# Patient Record
Sex: FEMALE | Race: WHITE | Employment: FULL TIME | URBAN - METROPOLITAN AREA
[De-identification: names, ages, dates, MRNs, and addresses within clinical notes are randomized per-mention and may not be internally consistent; named-entity substitution may affect disease eponyms.]

---

## 2022-09-07 ENCOUNTER — OFFICE VISIT (OUTPATIENT)
Dept: OBGYN CLINIC | Age: 27
End: 2022-09-07
Payer: COMMERCIAL

## 2022-09-07 VITALS
HEIGHT: 69 IN | BODY MASS INDEX: 20.94 KG/M2 | WEIGHT: 141.4 LBS | SYSTOLIC BLOOD PRESSURE: 108 MMHG | DIASTOLIC BLOOD PRESSURE: 74 MMHG

## 2022-09-07 DIAGNOSIS — Z01.419 ENCNTR FOR GYN EXAM (GENERAL) (ROUTINE) W/O ABN FINDINGS: Primary | ICD-10-CM

## 2022-09-07 DIAGNOSIS — Z12.4 ENCOUNTER FOR PAPANICOLAOU SMEAR FOR CERVICAL CANCER SCREENING: ICD-10-CM

## 2022-09-07 DIAGNOSIS — Z30.011 ENCOUNTER FOR INITIAL PRESCRIPTION OF CONTRACEPTIVE PILLS: ICD-10-CM

## 2022-09-07 PROCEDURE — 99385 PREV VISIT NEW AGE 18-39: CPT | Performed by: OBSTETRICS & GYNECOLOGY

## 2022-09-07 RX ORDER — NORETHINDRONE ACETATE AND ETHINYL ESTRADIOL 1MG-20(21)
1 KIT ORAL DAILY
Qty: 1 PACKET | Refills: 3 | Status: SHIPPED | OUTPATIENT
Start: 2022-09-07

## 2022-09-07 ASSESSMENT — PATIENT HEALTH QUESTIONNAIRE - PHQ9
SUM OF ALL RESPONSES TO PHQ QUESTIONS 1-9: 0
SUM OF ALL RESPONSES TO PHQ QUESTIONS 1-9: 0
2. FEELING DOWN, DEPRESSED OR HOPELESS: 0
1. LITTLE INTEREST OR PLEASURE IN DOING THINGS: 0
SUM OF ALL RESPONSES TO PHQ9 QUESTIONS 1 & 2: 0
SUM OF ALL RESPONSES TO PHQ QUESTIONS 1-9: 0
SUM OF ALL RESPONSES TO PHQ QUESTIONS 1-9: 0

## 2022-09-07 NOTE — PROGRESS NOTES
[facility]  [address] [facility], [address], [address] [facility]    Bradley Chavez MD, Karen Belle MD, FACOG  SAMANTHA Thao-BC    Assessment/Plan     Loretta Hernandez was seen today for new patient. Diagnoses and all orders for this visit:    Encntr for gyn exam (general) (routine) w/o abn findings  Comments:  - pap done  - self breast awareness encouraged  - virginal, getting  and requested OCPs    Encounter for Papanicolaou smear for cervical cancer screening  -     PAP LB, Reflex HPV ASCUS ([id number]); Future  -     PAP LB, Reflex HPV ASCUS ([id number])    Encounter for initial prescription of contraceptive pills  Comments:  - discussed various types of contraceptions inlcuding IUD, OCPs, Nuvaring. No CI to combined hormones. VTE warning signs and risk discussed. AUB can occur 3-6 mo when starting a new contraceptive. She is getting  in 2.5 months. Orders:  -     norethindrone-ethinyl estradiol (LOESTRIN FE 1/20) 1-20 MG-MCG per tablet; Take 1 tablet by mouth daily    BMI 20.0-20.9, adult         Subjective     Marisela Chafe  27 y.o. G0 presents today for annual Gyn exam.  New patient, has never had pelvic exam before. She is getting  in Nov. Her fiance is an ortho resident at [address] [place] [address]. 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 Menarche age 10, was regular every 28 days until she was in college and began having irregular periods. She went 9 months without a period when she was in nursing school. Evaluated by student health at [facility] when she was having a period q 3 months and was told to just watch it. No US or labs done at that time. She runs and eats healthy but normal BMI 20. Periods have been more consistent the last 2-3 months, lasting 5-6 days. Normal BM. No UTI symptoms. No breast concerns or nipple discharge. No HA, visual changes.          LAST PAP: never    LAST MAMMO: L breast US 2017 due to fibrocystic breast tissue    LMP: Patient's last menstrual period was 08/30/2022. BIRTH CONTROL: abstinence      OB History   No obstetric history on file. History reviewed. No pertinent past medical history. History reviewed. No pertinent surgical history. History reviewed. No pertinent family history.         Social History     Socioeconomic History    Marital status: Single     Spouse name: Not on file    Number of children: Not on file    Years of education: Not on file    Highest education level: Not on file   Occupational History    Not on file   Tobacco Use    Smoking status: Never    Smokeless tobacco: Never   Vaping Use    Vaping Use: Never used   Substance and Sexual Activity    Alcohol use: Yes    Drug use: Never    Sexual activity: Not Currently   Other Topics Concern    Not on file   Social History Narrative    Not on file     Social Determinants of Health     Financial Resource Strain: Not on file   Food Insecurity: Not on file   Transportation Needs: Not on file   Physical Activity: Not on file   Stress: Not on file   Social Connections: Not on file   Intimate Partner Violence: Not on file   Housing Stability: Not on file           Allergies   Allergen Reactions    Sulfa Antibiotics Hives           Review of Systems    Constitutional:  No fevers or chills    Neuro:  No headaches, seizure activity    HEENT: no visual changes, bleeding gums    CV: No chest pain or palpitations    Resp: No SOB or cough    Breast:  No masses, pain, D/C, bleeding    GI:  No nausea/vomiting/diarrhea/constipation    : No dysuria or hematuria    MS:  No back, point pain    Gyn:  No menstrual complaints, pelvic pain    Psych:  No depression, anxiety      Objective       Vitals:    09/07/22 1428   BP: 108/74         Physical Exam    General:  well developed, well nourished, in no acute distress     Head:   normocephalic and atraumatic     Mouth:  no deformity or lesions with good dentition     Neck:  no masses,

## 2022-09-07 NOTE — PROGRESS NOTES
LAST PAP:  Never    LAST MAMMO:  never    LMP:  No LMP recorded.     BIRTH CONTROL:  none    TOBACCO USE:  No    FAMILY HISTORY OF:   Breast Cancer:  No   Ovarian Cancer:  No   Uterine Cancer:  No   Colon Cancer:  No    Vitals:    09/07/22 1428   Weight: 141 lb 6.4 oz (64.1 kg)   Height: 5' 9\" (1.753 m)        Caro Carranza MA  09/07/22  2:34 PM

## 2022-09-18 LAB
CYTOLOGIST CVX/VAG CYTO: ABNORMAL
CYTOLOGY CVX/VAG DOC THIN PREP: ABNORMAL
HPV APTIMA: NEGATIVE
HPV REFLEX: ABNORMAL
Lab: ABNORMAL
PATH REPORT.FINAL DX SPEC: ABNORMAL
PATHOLOGIST CVX/VAG CYTO: ABNORMAL
PATHOLOGIST PROVIDED ICD: ABNORMAL
RECOM F/U CVX/VAG CYTO: ABNORMAL
STAT OF ADQ CVX/VAG CYTO-IMP: ABNORMAL

## 2022-09-21 ENCOUNTER — TELEPHONE (OUTPATIENT)
Dept: OBGYN CLINIC | Age: 27
End: 2022-09-21

## 2022-09-21 NOTE — TELEPHONE ENCOUNTER
Please let Selma Bruno know that her pap was ASCUS, but HPV neg which is considered a normal pap and not a precancerous change. This likely was just from some inflammation on the cervix. I recommend coming back for her annual in 1 year but she doesn't need anything before then.

## 2023-01-01 DIAGNOSIS — Z30.011 ENCOUNTER FOR INITIAL PRESCRIPTION OF CONTRACEPTIVE PILLS: ICD-10-CM

## 2023-01-03 RX ORDER — NORETHINDRONE ACETATE AND ETHINYL ESTRADIOL AND FERROUS FUMARATE 1MG-20(21)
KIT ORAL
Qty: 28 TABLET | Refills: 3 | OUTPATIENT
Start: 2023-01-03

## 2023-01-04 DIAGNOSIS — Z30.011 ENCOUNTER FOR INITIAL PRESCRIPTION OF CONTRACEPTIVE PILLS: ICD-10-CM

## 2023-01-04 RX ORDER — NORETHINDRONE ACETATE AND ETHINYL ESTRADIOL 1MG-20(21)
1 KIT ORAL DAILY
Qty: 1 PACKET | Refills: 11 | Status: SHIPPED | OUTPATIENT
Start: 2023-01-04

## 2023-01-04 NOTE — TELEPHONE ENCOUNTER
Patient LM stating she needs a refill on Junel FE. Patient states she was only given 4 months. After researching patient chart, patient was seen for Abelardo henao 09/07/2022.      Rx pend for enough until 09/23

## 2023-08-30 ENCOUNTER — OFFICE VISIT (OUTPATIENT)
Dept: OBGYN CLINIC | Age: 28
End: 2023-08-30

## 2023-08-30 DIAGNOSIS — Z01.419 ENCNTR FOR GYN EXAM (GENERAL) (ROUTINE) W/O ABN FINDINGS: Primary | ICD-10-CM

## 2023-09-14 ENCOUNTER — OFFICE VISIT (OUTPATIENT)
Dept: OBGYN CLINIC | Age: 28
End: 2023-09-14
Payer: COMMERCIAL

## 2023-09-14 VITALS
BODY MASS INDEX: 22.51 KG/M2 | SYSTOLIC BLOOD PRESSURE: 106 MMHG | WEIGHT: 152 LBS | DIASTOLIC BLOOD PRESSURE: 64 MMHG | HEIGHT: 69 IN

## 2023-09-14 DIAGNOSIS — Z30.41 SURVEILLANCE OF CONTRACEPTIVE PILL: ICD-10-CM

## 2023-09-14 DIAGNOSIS — Z12.4 ENCOUNTER FOR PAPANICOLAOU SMEAR FOR CERVICAL CANCER SCREENING: ICD-10-CM

## 2023-09-14 DIAGNOSIS — Z01.419 ENCNTR FOR GYN EXAM (GENERAL) (ROUTINE) W/O ABN FINDINGS: Primary | ICD-10-CM

## 2023-09-14 PROCEDURE — 99395 PREV VISIT EST AGE 18-39: CPT | Performed by: OBSTETRICS & GYNECOLOGY

## 2023-09-14 RX ORDER — NORETHINDRONE ACETATE AND ETHINYL ESTRADIOL 1MG-20(21)
1 KIT ORAL DAILY
Qty: 3 PACKET | Refills: 3 | Status: SHIPPED | OUTPATIENT
Start: 2023-09-14

## 2023-09-14 ASSESSMENT — PATIENT HEALTH QUESTIONNAIRE - PHQ9
1. LITTLE INTEREST OR PLEASURE IN DOING THINGS: 0
SUM OF ALL RESPONSES TO PHQ QUESTIONS 1-9: 0
2. FEELING DOWN, DEPRESSED OR HOPELESS: 0
SUM OF ALL RESPONSES TO PHQ9 QUESTIONS 1 & 2: 0
SUM OF ALL RESPONSES TO PHQ QUESTIONS 1-9: 0

## 2023-09-14 NOTE — PROGRESS NOTES
Huy Ng, [address], [address] [facility]    Ruby Short MD, Stefany Robbins MD, FACOG  SAMANTHA Reid-BC    Assessment/Plan     Bhaskar Cornejo was seen today for annual exam.    Diagnoses and all orders for this visit:    Encntr for gyn exam (general) (routine) w/o abn findings  Comments:  - pap today  - self breast awareness encouraged  - RF OCPs    Surveillance of contraceptive pill  Comments:  - doing ok with pill, would like to continue  - ok to skip period if she is consistent with pill and not pregnant  - was having night sweats, now rare  - consider inc estrogen in pill she has continued issues    Orders:  -     norethindrone-ethinyl estradiol ([address] FE 1/20) 1-20 MG-MCG per tablet; Take 1 tablet by mouth daily    Encounter for Papanicolaou smear for cervical cancer screening  -     PAP LB, Reflex HPV ASCUS ([id number]); Future           Subjective     Patricia Bi  34 y.o. Shelly Martinez presents today for annual Gyn exam.  No gyn complaints. 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 They are moving to [address] [place] [address] for a fellowship in [id number] [place] [address] in July. Then they plan to do a tumor fellowship. 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     She is doing well on the pill but still having irregular periods. Initially had 3 monthly periods, then skipping every other month. Not missing pills. She also initially had nightly sweats, had neg TB testing etc. Now sweats are rare, once a month. She does travel frequently and was in [place] x 2 months, traveled to [place] for her [employer] and just got back from a trip to [facility] with a friend. No breast complaints  Normal BM  No abnormal discharge  No urinary complaints          LAST PAP: 9/7/22 ASCUS, HPV neg. LAST MAMMO: N/A. Had L breast US 2017 due to fibrocystic breast tissue    LMP: Patient's last menstrual period was 09/01/2023 (exact date).     BIRTH

## 2023-09-14 NOTE — PROGRESS NOTES
Pt comes in today for AE. Pt needs BC refill     LAST PAP:  09/07/2022 ASCUS HPV negative     LAST MAMMO:  never    LMP:  Patient's last menstrual period was 09/01/2023 (exact date).     BIRTH CONTROL:  OCP (estrogen/progesterone)    TOBACCO USE:  No    FAMILY HISTORY OF:   Breast Cancer:  No   Ovarian Cancer:  No   Uterine Cancer:  No   Colon Cancer:  No    Vitals:    09/14/23 1127   BP: 106/64   Site: Left Upper Arm   Position: Sitting   Weight: 152 lb (68.9 kg)   Height: 5' 9\" (1.753 m)        [place], [place]  09/14/23  11:32 AM

## 2023-09-16 LAB
CYTOLOGIST CVX/VAG CYTO: NORMAL
CYTOLOGY CVX/VAG DOC THIN PREP: NORMAL
HPV REFLEX: NORMAL
Lab: NORMAL
PATH REPORT.FINAL DX SPEC: NORMAL
STAT OF ADQ CVX/VAG CYTO-IMP: NORMAL